# Patient Record
Sex: FEMALE | Race: WHITE
[De-identification: names, ages, dates, MRNs, and addresses within clinical notes are randomized per-mention and may not be internally consistent; named-entity substitution may affect disease eponyms.]

---

## 2018-05-15 ENCOUNTER — HOSPITAL ENCOUNTER (EMERGENCY)
Dept: HOSPITAL 96 - M.ERS | Age: 58
Discharge: HOME | End: 2018-05-15
Payer: MEDICARE

## 2018-05-15 VITALS — WEIGHT: 160.01 LBS | BODY MASS INDEX: 29.45 KG/M2 | HEIGHT: 62 IN

## 2018-05-15 VITALS — DIASTOLIC BLOOD PRESSURE: 73 MMHG | SYSTOLIC BLOOD PRESSURE: 171 MMHG

## 2018-05-15 DIAGNOSIS — F41.9: ICD-10-CM

## 2018-05-15 DIAGNOSIS — Z88.8: ICD-10-CM

## 2018-05-15 DIAGNOSIS — K21.9: ICD-10-CM

## 2018-05-15 DIAGNOSIS — Z88.5: ICD-10-CM

## 2018-05-15 DIAGNOSIS — F32.9: ICD-10-CM
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
## 2018-05-15 DIAGNOSIS — Z90.49: ICD-10-CM

## 2018-05-15 DIAGNOSIS — Z88.6: ICD-10-CM
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
## 2018-05-15 DIAGNOSIS — S22.31XA: Primary | ICD-10-CM

## 2018-07-01 ENCOUNTER — HOSPITAL ENCOUNTER (EMERGENCY)
Dept: HOSPITAL 96 - M.ERS | Age: 58
Discharge: HOME | End: 2018-07-01
Payer: MEDICARE

## 2018-07-01 VITALS — WEIGHT: 160.01 LBS | HEIGHT: 62 IN | BODY MASS INDEX: 29.45 KG/M2

## 2018-07-01 VITALS — SYSTOLIC BLOOD PRESSURE: 156 MMHG | DIASTOLIC BLOOD PRESSURE: 81 MMHG

## 2018-07-01 DIAGNOSIS — Z88.6: ICD-10-CM

## 2018-07-01 DIAGNOSIS — Z88.8: ICD-10-CM

## 2018-07-01 DIAGNOSIS — Z90.49: ICD-10-CM

## 2018-07-01 DIAGNOSIS — K21.9: ICD-10-CM

## 2018-07-01 DIAGNOSIS — R10.31: Primary | ICD-10-CM

## 2018-07-01 DIAGNOSIS — Z88.5: ICD-10-CM

## 2018-07-01 DIAGNOSIS — F32.9: ICD-10-CM

## 2018-07-01 DIAGNOSIS — F41.9: ICD-10-CM

## 2018-07-01 LAB
ABSOLUTE BASOPHILS: 0.1 THOU/UL (ref 0–0.2)
ABSOLUTE EOSINOPHILS: 0.2 THOU/UL (ref 0–0.7)
ABSOLUTE MONOCYTES: 0.6 THOU/UL (ref 0–1.2)
ALBUMIN SERPL-MCNC: 4 G/DL (ref 3.4–5)
ALP SERPL-CCNC: 141 U/L (ref 46–116)
ALT SERPL-CCNC: 18 U/L (ref 30–65)
ANION GAP SERPL CALC-SCNC: 9 MMOL/L (ref 7–16)
AST SERPL-CCNC: 14 U/L (ref 15–37)
BASOPHILS NFR BLD AUTO: 1.1 %
BILIRUB SERPL-MCNC: 0.5 MG/DL
BILIRUB UR-MCNC: NEGATIVE MG/DL
BUN SERPL-MCNC: 13 MG/DL (ref 7–18)
CALCIUM SERPL-MCNC: 9.5 MG/DL (ref 8.5–10.1)
CHLORIDE SERPL-SCNC: 104 MMOL/L (ref 98–107)
CO2 SERPL-SCNC: 29 MMOL/L (ref 21–32)
COLOR UR: YELLOW
CREAT SERPL-MCNC: 0.7 MG/DL (ref 0.6–1.3)
EOSINOPHIL NFR BLD: 2.3 %
GLUCOSE SERPL-MCNC: 98 MG/DL (ref 70–99)
GRANULOCYTES NFR BLD MANUAL: 71.1 %
HCT VFR BLD CALC: 39.7 % (ref 37–47)
HGB BLD-MCNC: 13.3 GM/DL (ref 12–15)
KETONES UR STRIP-MCNC: NEGATIVE MG/DL
LIPASE: 132 U/L (ref 73–393)
LYMPHOCYTES # BLD: 2.2 THOU/UL (ref 0.8–5.3)
LYMPHOCYTES NFR BLD AUTO: 20 %
MCH RBC QN AUTO: 30.9 PG (ref 26–34)
MCHC RBC AUTO-ENTMCNC: 33.6 G/DL (ref 28–37)
MCV RBC: 92 FL (ref 80–100)
MONOCYTES NFR BLD: 5.5 %
MPV: 8.2 FL. (ref 7.2–11.1)
NEUTROPHILS # BLD: 7.8 THOU/UL (ref 1.6–8.1)
NUCLEATED RBCS: 0 /100WBC
PLATELET COUNT*: 328 THOU/UL (ref 150–400)
POTASSIUM SERPL-SCNC: 3.7 MMOL/L (ref 3.5–5.1)
PROT SERPL-MCNC: 7.5 G/DL (ref 6.4–8.2)
PROT UR QL STRIP: NEGATIVE
RBC # BLD AUTO: 4.31 MIL/UL (ref 4.2–5)
RBC # UR STRIP: (no result) /UL
RDW-CV: 13 % (ref 10.5–14.5)
SODIUM SERPL-SCNC: 142 MMOL/L (ref 136–145)
SP GR UR STRIP: 1.01 (ref 1–1.03)
URINE CLARITY: CLEAR
URINE GLUCOSE-RANDOM: NEGATIVE
URINE LEUKOCYTES-REFLEX: NEGATIVE
URINE NITRITE-REFLEX: NEGATIVE
UROBILINOGEN UR STRIP-ACNC: 0.2 E.U./DL (ref 0.2–1)
WBC # BLD AUTO: 11 THOU/UL (ref 4–11)

## 2019-04-15 ENCOUNTER — HOSPITAL ENCOUNTER (OUTPATIENT)
Dept: HOSPITAL 96 - M.CRD | Age: 59
End: 2019-04-15
Attending: INTERNAL MEDICINE
Payer: MEDICARE

## 2019-04-15 DIAGNOSIS — I08.8: Primary | ICD-10-CM

## 2019-04-15 DIAGNOSIS — Z88.5: ICD-10-CM

## 2019-04-15 DIAGNOSIS — Z88.8: ICD-10-CM

## 2019-04-15 DIAGNOSIS — I47.1: ICD-10-CM

## 2019-04-15 NOTE — 2DMMODE
[place] [address]
Phone:  [phone] 2 D/M-MODE ECHOCARDIOGRAM     
_______________________________________________________________________________
 
Name:         MATTEO BENAVIDES             Room:                     REG CLI
M.R.#:    [id number]     Account #:     [id number]  
Admission:    04/15/19    Attend Phys:   Josesito Little,
Discharge:                Date of Birth: [birth year]  
Date of Service: 04/15/19 1342  Report #:      [id number]
        [id number]
_______________________________________________________________________________
THIS REPORT FOR:  //name//                      
 
 
--------------- APPROVED REPORT --------------
 
 
Study performed:  04/15/2019 12:55:01
 
EXAM: Comprehensive 2D, Doppler, and color-flow 
Echocardiogram 
Patient Location: Out-Patient   
 
      BSA:         1.81
HR: 59 bpm BP:          126/72 mmHg 
 
Other Information 
Study Quality: Good
 
Indications
Palpitations
 
2D Dimensions
IVSd:  12.30 (7-11mm) LVOT Diam:  20.40 (18-24mm) 
LVDd:  38.06 mm  
PWd:  9.52 (7-11mm) Ascending Ao:  29.17 (22-36mm)
LVDs:  26.40 (25-40mm) 
Aortic Root:  24.00 mm 
 
Volumes
Left Atrial Volume (Systole) 
    LA ESV Index:  16.10 mL/m2
 
Aortic Valve
AoV Peak Vivek.:  1.12 m/s 
AO Peak Gr.:  5.06 mmHg  LVOT Max PG:  3.84 mmHg
AO Mean Gr.:  2.50 mmHg  LVOT Mean P.77 mmHg
    LVOT Max V:  0.98 m/s
AO V2 VTI:  21.98 cm  LVOT Mean V:  0.60 m/s
SUMANTH (VTI):  3.26 cm2  LVOT V1 VTI:  21.94 cm
 
Mitral Valve
    E/A Ratio:  1.13
    MV Decel. Time:  182.33 ms
MV E Max Vivek.:  0.92 m/s 
MV PHT:  52.88 ms  
MVA (PHT):  4.16 cm2  
 
 
[place] [address]
Phone:  [phone]                     2 D/M-MODE ECHOCARDIOGRAM     
_______________________________________________________________________________
 
Name:         MATTEO BENAVIDES             Room:                     REG CLI
M.R.#:    [id number]     Account #:     [id number]  
Admission:    04/15/19    Attend Phys:   Josesito Little,
Discharge:                Date of Birth: [birth year]  
Date of Service: 04/15/19 1342  Report #:      [id number]
        [id number]
_______________________________________________________________________________
 
TDI
E/Lateral E':  9.20 E/Medial E':  10.22
   Medial E' Vivek.:  0.09 m/s
   Lateral E' Vivek.:  0.10 m/s
 
Pulmonary Valve
PV Peak Vivek.:  0.99 m/s PV Peak Gr.:  3.90 mmHg
 
Tricuspid Valve
    RAP Estimate:  5.00 mmHg
TR Peak Gr.:  18.30 mmHg RVSP:  23.30 mmHg
    PA Pressure:  23.30 mmHg
 
Left Ventricle
The left ventricle is normal size. There is normal LV segmental wall 
motion. There is normal left ventricular wall thickness. Left 
ventricular systolic function is normal. The left ventricular 
ejection fraction is within the normal range. LVEF is 60%. The left 
ventricular diastolic function is normal.
 
Right Ventricle
The right ventricle is normal size. The right ventricular systolic 
function is normal.
 
Atria
The left atrium size is normal. The right atrium size is 
normal.
 
Aortic Valve
The aortic valve is normal in structure. No aortic regurgitation is 
present. There is no aortic valvular stenosis.
 
Mitral Valve
The mitral valve is normal in structure. There is no mitral valve 
regurgitation noted. No evidence of mitral valve stenosis.
 
Tricuspid Valve
The tricuspid valve is normal in structure. Mild tricuspid 
regurgitation.
 
Pulmonic Valve
The pulmonary valve is normal in structure. Mild pulmonic 
regurgitation.
 
[facility]
 
 
[place] [address]
Phone:  [phone]                     2 D/M-MODE ECHOCARDIOGRAM     
_______________________________________________________________________________
 
Name:         MATTEO BENAVIDES             Room:                     REG CLI
M.R.#:    [id number]     Account #:     [id number]  
Admission:    04/15/19    Attend Phys:   Josesito Little,
Discharge:                Date of Birth: [birth year]  
Date of Service: 04/15/19 1342  Report #:      [id number]
        [id number]
_______________________________________________________________________________
The aortic root is normal in size. IVC is normal in size and 
collapses >50% with inspiration.
 
Pericardium
There is no pericardial effusion.
 
<Conclusion>
The left ventricle is normal size.
There is normal left ventricular wall thickness.
Left ventricular systolic function is normal.
The left ventricular ejection fraction is within the normal 
range.
LVEF is 60%.
The right ventricle is normal size.
The left atrium size is normal.
The aortic valve is normal in structure.
The mitral valve is normal in structure.
The tricuspid valve is normal in structure.
IVC is normal in size and collapses >50% with inspiration.
There is no pericardial effusion.
There is normal LV segmental wall motion.
 
 
 
 
 
 
 
 
 
 
 
 
 
 
 
 
 
 
 
 
 
 
 
  <ELECTRONICALLY SIGNED>
                                           By: Davi Fields MD, FACC     
  04/15/19     1342
D: 04/15/19 1342   _____________________________________
T: 04/15/19 134   Davi Fields MD, FACC       /INF

## 2019-08-23 ENCOUNTER — HOSPITAL ENCOUNTER (EMERGENCY)
Dept: HOSPITAL 96 - M.ERS | Age: 59
LOS: 1 days | Discharge: HOME | End: 2019-08-24
Payer: MEDICARE

## 2019-08-23 VITALS — BODY MASS INDEX: 32.2 KG/M2 | HEIGHT: 62 IN | WEIGHT: 175 LBS

## 2019-08-23 DIAGNOSIS — R10.13: ICD-10-CM

## 2019-08-23 DIAGNOSIS — Z85.528: ICD-10-CM

## 2019-08-23 DIAGNOSIS — Z90.710: ICD-10-CM

## 2019-08-23 DIAGNOSIS — K21.9: ICD-10-CM

## 2019-08-23 DIAGNOSIS — Z88.8: ICD-10-CM

## 2019-08-23 DIAGNOSIS — Z88.5: ICD-10-CM

## 2019-08-23 DIAGNOSIS — R19.7: ICD-10-CM

## 2019-08-23 DIAGNOSIS — R11.2: Primary | ICD-10-CM

## 2019-08-23 DIAGNOSIS — Z88.6: ICD-10-CM

## 2019-08-23 DIAGNOSIS — Z90.49: ICD-10-CM

## 2019-08-23 DIAGNOSIS — Z90.5: ICD-10-CM

## 2019-08-23 LAB
ABSOLUTE BASOPHILS: 0.1 THOU/UL (ref 0–0.2)
ABSOLUTE EOSINOPHILS: 0.1 THOU/UL (ref 0–0.7)
ABSOLUTE MONOCYTES: 1.3 THOU/UL (ref 0–1.2)
ALBUMIN SERPL-MCNC: 3.9 G/DL (ref 3.4–5)
ALP SERPL-CCNC: 140 U/L (ref 46–116)
ALT SERPL-CCNC: 22 U/L (ref 30–65)
ANION GAP SERPL CALC-SCNC: 9 MMOL/L (ref 7–16)
AST SERPL-CCNC: 17 U/L (ref 15–37)
BASOPHILS NFR BLD AUTO: 0.6 %
BILIRUB SERPL-MCNC: 0.5 MG/DL
BILIRUB UR-MCNC: NEGATIVE MG/DL
BUN SERPL-MCNC: 14 MG/DL (ref 7–18)
CALCIUM SERPL-MCNC: 8.4 MG/DL (ref 8.5–10.1)
CHLORIDE SERPL-SCNC: 104 MMOL/L (ref 98–107)
CO2 SERPL-SCNC: 28 MMOL/L (ref 21–32)
COLOR UR: YELLOW
CREAT SERPL-MCNC: 0.8 MG/DL (ref 0.6–1.3)
EOSINOPHIL NFR BLD: 0.7 %
GLUCOSE SERPL-MCNC: 111 MG/DL (ref 70–99)
GRANULOCYTES NFR BLD MANUAL: 81.8 %
HCT VFR BLD CALC: 41 % (ref 37–47)
HGB BLD-MCNC: 13.6 GM/DL (ref 12–15)
INR PPP: 0.9
KETONES UR STRIP-MCNC: NEGATIVE MG/DL
LIPASE: 82 U/L (ref 73–393)
LYMPHOCYTES # BLD: 2 THOU/UL (ref 0.8–5.3)
LYMPHOCYTES NFR BLD AUTO: 10.2 %
MCH RBC QN AUTO: 31 PG (ref 26–34)
MCHC RBC AUTO-ENTMCNC: 33.2 G/DL (ref 28–37)
MCV RBC: 93.4 FL (ref 80–100)
MONOCYTES NFR BLD: 6.7 %
MPV: 8.3 FL. (ref 7.2–11.1)
NEUTROPHILS # BLD: 16 THOU/UL (ref 1.6–8.1)
NUCLEATED RBCS: 0 /100WBC
PLATELET COUNT*: 316 THOU/UL (ref 150–400)
POTASSIUM SERPL-SCNC: 3.7 MMOL/L (ref 3.5–5.1)
PROT SERPL-MCNC: 7.3 G/DL (ref 6.4–8.2)
PROT UR QL STRIP: NEGATIVE
PROTHROMBIN TIME: 9.7 SECONDS (ref 9.2–11.5)
RBC # BLD AUTO: 4.39 MIL/UL (ref 4.2–5)
RBC # UR STRIP: NEGATIVE /UL
RDW-CV: 13.2 % (ref 10.5–14.5)
SODIUM SERPL-SCNC: 141 MMOL/L (ref 136–145)
SP GR UR STRIP: 1.01 (ref 1–1.03)
TROPONIN-I LEVEL: <0.06 NG/ML (ref ?–0.06)
URINE CLARITY: CLEAR
URINE GLUCOSE-RANDOM: NEGATIVE
URINE LEUKOCYTES-REFLEX: NEGATIVE
URINE NITRITE-REFLEX: NEGATIVE
UROBILINOGEN UR STRIP-ACNC: 0.2 E.U./DL (ref 0.2–1)
WBC # BLD AUTO: 19.6 THOU/UL (ref 4–11)

## 2019-08-24 VITALS — SYSTOLIC BLOOD PRESSURE: 133 MMHG | DIASTOLIC BLOOD PRESSURE: 79 MMHG

## 2020-01-11 ENCOUNTER — HOSPITAL ENCOUNTER (EMERGENCY)
Dept: HOSPITAL 96 - M.ERS | Age: 60
LOS: 1 days | Discharge: HOME | End: 2020-01-12
Payer: MEDICARE

## 2020-01-11 VITALS — HEIGHT: 62 IN | BODY MASS INDEX: 32.94 KG/M2 | WEIGHT: 179 LBS

## 2020-01-11 DIAGNOSIS — Z88.6: ICD-10-CM

## 2020-01-11 DIAGNOSIS — Z90.49: ICD-10-CM

## 2020-01-11 DIAGNOSIS — Z88.8: ICD-10-CM

## 2020-01-11 DIAGNOSIS — Z90.5: ICD-10-CM

## 2020-01-11 DIAGNOSIS — Z90.710: ICD-10-CM

## 2020-01-11 DIAGNOSIS — K21.9: ICD-10-CM

## 2020-01-11 DIAGNOSIS — Z88.5: ICD-10-CM

## 2020-01-11 DIAGNOSIS — R10.13: Primary | ICD-10-CM

## 2020-01-11 DIAGNOSIS — Z85.528: ICD-10-CM

## 2020-01-11 LAB
ABSOLUTE BASOPHILS: 0.2 THOU/UL (ref 0–0.2)
ABSOLUTE EOSINOPHILS: 0.3 THOU/UL (ref 0–0.7)
ABSOLUTE MONOCYTES: 1.2 THOU/UL (ref 0–1.2)
ALBUMIN SERPL-MCNC: 4 G/DL (ref 3.4–5)
ALP SERPL-CCNC: 124 U/L (ref 46–116)
ALT SERPL-CCNC: 25 U/L (ref 30–65)
ANION GAP SERPL CALC-SCNC: 10 MMOL/L (ref 7–16)
AST SERPL-CCNC: 15 U/L (ref 15–37)
BASOPHILS NFR BLD AUTO: 1.5 %
BILIRUB SERPL-MCNC: 0.2 MG/DL
BILIRUB UR-MCNC: NEGATIVE MG/DL
BUN SERPL-MCNC: 18 MG/DL (ref 7–18)
CALCIUM SERPL-MCNC: 9 MG/DL (ref 8.5–10.1)
CHLORIDE SERPL-SCNC: 104 MMOL/L (ref 98–107)
CO2 SERPL-SCNC: 28 MMOL/L (ref 21–32)
COLOR UR: YELLOW
CREAT SERPL-MCNC: 1.1 MG/DL (ref 0.6–1.3)
EOSINOPHIL NFR BLD: 2 %
GLUCOSE SERPL-MCNC: 110 MG/DL (ref 70–99)
GRANULOCYTES NFR BLD MANUAL: 64.4 %
HCT VFR BLD CALC: 37.1 % (ref 37–47)
HGB BLD-MCNC: 12.5 GM/DL (ref 12–15)
KETONES UR STRIP-MCNC: NEGATIVE MG/DL
LIPASE: 115 U/L (ref 73–393)
LYMPHOCYTES # BLD: 3.6 THOU/UL (ref 0.8–5.3)
LYMPHOCYTES NFR BLD AUTO: 24.1 %
MCH RBC QN AUTO: 30.1 PG (ref 26–34)
MCHC RBC AUTO-ENTMCNC: 33.7 G/DL (ref 28–37)
MCV RBC: 89.2 FL (ref 80–100)
MONOCYTES NFR BLD: 8 %
MPV: 8.2 FL. (ref 7.2–11.1)
NEUTROPHILS # BLD: 9.6 THOU/UL (ref 1.6–8.1)
NUCLEATED RBCS: 0 /100WBC
PLATELET COUNT*: 319 THOU/UL (ref 150–400)
POTASSIUM SERPL-SCNC: 4.5 MMOL/L (ref 3.5–5.1)
PROT SERPL-MCNC: 6.8 G/DL (ref 6.4–8.2)
PROT UR QL STRIP: NEGATIVE
RBC # BLD AUTO: 4.16 MIL/UL (ref 4.2–5)
RBC # UR STRIP: NEGATIVE /UL
RDW-CV: 13.5 % (ref 10.5–14.5)
SODIUM SERPL-SCNC: 142 MMOL/L (ref 136–145)
SP GR UR STRIP: 1.01 (ref 1–1.03)
URINE CLARITY: CLEAR
URINE GLUCOSE-RANDOM: NEGATIVE
URINE LEUKOCYTES-REFLEX: NEGATIVE
URINE NITRITE-REFLEX: NEGATIVE
UROBILINOGEN UR STRIP-ACNC: 0.2 E.U./DL (ref 0.2–1)
WBC # BLD AUTO: 14.9 THOU/UL (ref 4–11)

## 2020-01-12 VITALS — DIASTOLIC BLOOD PRESSURE: 74 MMHG | SYSTOLIC BLOOD PRESSURE: 129 MMHG

## 2020-01-12 NOTE — EKG
[place] [address]
Phone:  [phone]                     ELECTROCARDIOGRAM REPORT      
_______________________________________________________________________________
 
Name:       MATTEO BENAVIDES              Room:                      [facility]#:  [id number]      Account #:      [id number]  
Admission:  20     Attend Phys:                         
Discharge:  20     Date of Birth:  [birth year]  
         Report #: [id number]
    [id number]
_______________________________________________________________________________
THIS REPORT FOR:  //name//                      
 
                         [facility] ED
                                       
Test Date:    2020               Test Time:    21:48:02
Pat Name:     MATTEO BENAVIDES            Department:   
Patient ID:   [id number]            Room:          
Gender:       F                        Technician:   FL
:          1960               Requested By: Vivian Toth
Order Number: [id number] MD:   Cordell Diehl
                                 Measurements
Intervals                              Axis          
Rate:         91                       P:            75
OR:           186                      QRS:          42
QRSD:         78                       T:            52
QT:           345                                    
QTc:          425                                    
                           Interpretive Statements
Sinus rhythm
Compared to ECG 2017 21:21:03
No significant changes
 
Electronically Signed On 2020 10:37:25 CST by Cordell Diehl
[email or web address]
 
 
 
 
 
 
 
 
 
 
 
 
 
 
 
 
 
 
 
  <ELECTRONICALLY SIGNED>
                                           By: ADIEL Diehl MD, [facility]    
  20     1037
D: 20 2148   _____________________________________
T: 20   ADIEL Diehl MD, FACC      /EPI

## 2020-01-21 ENCOUNTER — HOSPITAL ENCOUNTER (INPATIENT)
Dept: HOSPITAL 96 - M.ERS | Age: 60
LOS: 3 days | Discharge: HOME | DRG: 391 | End: 2020-01-24
Attending: INTERNAL MEDICINE | Admitting: INTERNAL MEDICINE
Payer: MEDICARE

## 2020-01-21 VITALS — DIASTOLIC BLOOD PRESSURE: 78 MMHG | SYSTOLIC BLOOD PRESSURE: 139 MMHG

## 2020-01-21 VITALS — SYSTOLIC BLOOD PRESSURE: 164 MMHG | DIASTOLIC BLOOD PRESSURE: 75 MMHG

## 2020-01-21 VITALS — DIASTOLIC BLOOD PRESSURE: 78 MMHG | SYSTOLIC BLOOD PRESSURE: 149 MMHG

## 2020-01-21 VITALS — WEIGHT: 175 LBS | BODY MASS INDEX: 31.8 KG/M2 | HEIGHT: 62.01 IN

## 2020-01-21 DIAGNOSIS — K86.1: ICD-10-CM

## 2020-01-21 DIAGNOSIS — K44.9: ICD-10-CM

## 2020-01-21 DIAGNOSIS — F17.210: ICD-10-CM

## 2020-01-21 DIAGNOSIS — K21.9: ICD-10-CM

## 2020-01-21 DIAGNOSIS — F32.9: ICD-10-CM

## 2020-01-21 DIAGNOSIS — Z90.49: ICD-10-CM

## 2020-01-21 DIAGNOSIS — Z90.710: ICD-10-CM

## 2020-01-21 DIAGNOSIS — Z85.528: ICD-10-CM

## 2020-01-21 DIAGNOSIS — Z90.5: ICD-10-CM

## 2020-01-21 DIAGNOSIS — K85.90: ICD-10-CM

## 2020-01-21 DIAGNOSIS — K29.90: Primary | ICD-10-CM

## 2020-01-21 DIAGNOSIS — Z88.8: ICD-10-CM

## 2020-01-21 LAB
ABSOLUTE BASOPHILS: 0.1 THOU/UL (ref 0–0.2)
ABSOLUTE EOSINOPHILS: 0.3 THOU/UL (ref 0–0.7)
ABSOLUTE MONOCYTES: 0.9 THOU/UL (ref 0–1.2)
ALBUMIN SERPL-MCNC: 3.9 G/DL (ref 3.4–5)
ALP SERPL-CCNC: 119 U/L (ref 46–116)
ALT SERPL-CCNC: 30 U/L (ref 30–65)
ANION GAP SERPL CALC-SCNC: 10 MMOL/L (ref 7–16)
AST SERPL-CCNC: 21 U/L (ref 15–37)
BASOPHILS NFR BLD AUTO: 1.2 %
BILIRUB SERPL-MCNC: 0.5 MG/DL
BILIRUB UR-MCNC: NEGATIVE MG/DL
BUN SERPL-MCNC: 12 MG/DL (ref 7–18)
CALCIUM SERPL-MCNC: 9 MG/DL (ref 8.5–10.1)
CHLORIDE SERPL-SCNC: 105 MMOL/L (ref 98–107)
CO2 SERPL-SCNC: 28 MMOL/L (ref 21–32)
COLOR UR: YELLOW
CREAT SERPL-MCNC: 0.9 MG/DL (ref 0.6–1.3)
EOSINOPHIL NFR BLD: 2.8 %
GLUCOSE SERPL-MCNC: 110 MG/DL (ref 70–99)
GRANULOCYTES NFR BLD MANUAL: 59.7 %
HCT VFR BLD CALC: 40.6 % (ref 37–47)
HGB BLD-MCNC: 13.7 GM/DL (ref 12–15)
KETONES UR STRIP-MCNC: (no result) MG/DL
LIPASE: 110 U/L (ref 73–393)
LYMPHOCYTES # BLD: 2.8 THOU/UL (ref 0.8–5.3)
LYMPHOCYTES NFR BLD AUTO: 27.8 %
MCH RBC QN AUTO: 30.5 PG (ref 26–34)
MCHC RBC AUTO-ENTMCNC: 33.8 G/DL (ref 28–37)
MCV RBC: 90.2 FL (ref 80–100)
MONOCYTES NFR BLD: 8.5 %
MPV: 8.4 FL. (ref 7.2–11.1)
NEUTROPHILS # BLD: 6.1 THOU/UL (ref 1.6–8.1)
NUCLEATED RBCS: 0 /100WBC
PLATELET COUNT*: 336 THOU/UL (ref 150–400)
POTASSIUM SERPL-SCNC: 3.9 MMOL/L (ref 3.5–5.1)
PROT SERPL-MCNC: 7.3 G/DL (ref 6.4–8.2)
PROT UR QL STRIP: NEGATIVE
RBC # BLD AUTO: 4.51 MIL/UL (ref 4.2–5)
RBC # UR STRIP: NEGATIVE /UL
RDW-CV: 13.5 % (ref 10.5–14.5)
SODIUM SERPL-SCNC: 143 MMOL/L (ref 136–145)
SP GR UR STRIP: 1.02 (ref 1–1.03)
URINE CLARITY: CLEAR
URINE GLUCOSE-RANDOM: NEGATIVE
URINE LEUKOCYTES-REFLEX: NEGATIVE
URINE NITRITE-REFLEX: NEGATIVE
UROBILINOGEN UR STRIP-ACNC: 1 E.U./DL (ref 0.2–1)
WBC # BLD AUTO: 10.2 THOU/UL (ref 4–11)

## 2020-01-21 NOTE — PROC
[facility] 
[address] [place]  [address]                    PROCEDURE REPORT              
_______________________________________________________________________________
 
Name:       MATTEO BENAVIDES              Room:           [address] [place]  
..#:  [id number]      Account #:      [id number]  
Admission:  01/21/20     Attend Phys:    JASWANT Marte
Discharge:               Date of Birth:  [birth year]  
         Report #: [id number]
                                                                                
_______________________________________________________________________________
THIS REPORT FOR:  //name//                      
 
For GI report, please see the Provation report in Perceptive 7 content.
 
 
 
 
 
 
 
 
 
 
 
 
 
 
 
 
 
 
 
 
 
 
 
 
 
 
 
 
 
 
 
 
 
 
 
 
 
 
 
 
 
                       
                                        By:                                
                 
D: 01/22/20     _______________________________________
T: 01/24/20 0639Medical Records Staff JADYN       /AL

## 2020-01-21 NOTE — CON
[facility] 
[address] [place]  [address]                    CONSULTATION                  
_______________________________________________________________________________
 
Name:       MATTEO BENAVIDES              Room:           [address] [place]  
M.R.#:  [id number]      Account #:      [id number]  
Admission:  01/21/20     Attend Phys:    JASWANT Marte
Discharge:               Date of Birth:  [birth year]  
         Report #: [id number]
                                                                     [id number]  
_______________________________________________________________________________
THIS REPORT FOR:  //name//                      
 
CC: ROLO Cosme
 
DICTATED BY: Azra Rolon [facility]
 
Please note at the time of this dictation, the patient was seen and physically
examined by myself.
 
HISTORY OF PRESENT ILLNESS:  This is a 59-year-old female who presented to the
Emergency Room that she started having abdominal pain that wraps around into her
back and nausea for 2 hours prior to her coming in.  She does have a history of
chronic pancreatitis she has been told.  She initially was diagnosed with
pancreatitis many years ago and was found to be gallstone related.  She is not
able to tell me when the last time she had an exacerbation of this at that time
and she does not take any pancreatic enzymes either.  The patient states she
does have issues with acid reflux in which she takes Nexium and ranitidine for. 
She will have some breakthrough every couple of months and she will do some
baking soda water to help with that.  The patient states that sometimes when she
eats red sauce, it may exacerbate or worsen some of her symptoms.  She states
that she has seen [facility] in the past and done upper and lower scopes.  She
cannot recall how long ago or what the findings were at that time and we will
attempt to get those records for further recommendations.  The patient denies
any vomiting, no diarrhea, no constipation, fever or chills upon admission.
 
ALLERGIES:  INCLUDE ATIVAN, DEMEROL, VICODIN, CODEINE, BENADRYL, MORPHINE,
TORADOL AND PHENERGAN.
 
PAST MEDICAL HISTORY:  GERD.  She has had renal cancer.  Ocular histoplasmosis,
macular degeneration and she is blind.
 
PAST SURGICAL HISTORY:  Partial right nephrectomy, cholecystectomy and
hysterectomy.
 
FAMILY HISTORY:  Negative for any GI or female cancers.
 
SOCIAL HISTORY:  She does smoke 3/4 of a pack a day.  Denies any alcohol or
illegal drug use.
 
REVIEW OF SYSTEMS:  Twelve-point review of systems is essentially negative
except what is mentioned in the HPI.
 
PHYSICAL EXAMINATION:
VITAL SIGNS:  Temperature 36.6, pulse 69, respirations 17, blood pressure
 
 
 
[place] [address]                    CONSULTATION                  
_______________________________________________________________________________
 
Name:       MATTEO BENAVIDES              Room:           [address] [place]  
[facility]#:  [id number]      Account #:      [id number]  
Admission:  01/21/20     Attend Phys:    JASWANT Marte
Discharge:               Date of Birth:  [birth year]  
         Report #: [id number]
                                                                     [id number]  
_______________________________________________________________________________
133/70.
HEART:  Regular rate and rhythm.
LUNGS:  Clear.
ABDOMEN:  Soft, positive bowel sounds in all 4 quadrants with some epigastric
tenderness noted to palpation.
 
LABORATORY DATA:  Hemoglobin is 13.7, white count is 10.2, platelets 336.  Her
troponin is negative.  Her lipase was only 110, total bilirubin 0.5, alkaline
phosphatase 119, ALT 30, AST is 21.  
 
CT showed post-cholecystectomy, mild intrahepatic biliary dilatation noted,
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
antrum and duodenum.
 
IMPRESSION:
1.  Abdominal pain, epigastric.
2.  Nausea.
3.  Gastroesophageal reflux disease.
4.  Abnormal CT.
5.  Elevated alkaline phosphatase.
6.  History of pancreatitis related to gallstones in the past.
7.  History of renal cell.
 
PLAN:
1.  EGD today with Dr. Mora.
2.  We will obtain her records from [facility].
3.  Further recommendations to be made after the above has been noted.
 
Thank you for allowing us to participate in this patient's care.  Please do not
hesitate to call with any questions in regard to this consult.
 
 
 
 
 
 
 
 
 
 
 
 
 
 
                       
                                        By:                                
                 
D: 01/22/20 1059_______________________________________
T: 01/23/20 0011Hosea Mora MD            /nt

## 2020-01-22 VITALS — SYSTOLIC BLOOD PRESSURE: 133 MMHG | DIASTOLIC BLOOD PRESSURE: 70 MMHG

## 2020-01-22 VITALS — SYSTOLIC BLOOD PRESSURE: 136 MMHG | DIASTOLIC BLOOD PRESSURE: 68 MMHG

## 2020-01-22 PROCEDURE — 0DB68ZX EXCISION OF STOMACH, VIA NATURAL OR ARTIFICIAL OPENING ENDOSCOPIC, DIAGNOSTIC: ICD-10-PCS | Performed by: INTERNAL MEDICINE

## 2020-01-22 NOTE — EKG
[place] [address]
Phone:  [phone]                     ELECTROCARDIOGRAM REPORT      
_______________________________________________________________________________
 
Name:       MATTEO BENAVIDES              Room:           [address]    [place]  
.R.#:  [id number]      Account #:      [id number]  
Admission:  20     Attend Phys:    JASWANT Marte
Discharge:               Date of Birth:  [birth year]  
         Report #: [id number]
    [id number]
_______________________________________________________________________________
THIS REPORT FOR:  //name//                      
 
                         [facility] ED
                                       
Test Date:    2020               Test Time:    16:34:47
Pat Name:     MATTEO BENAVIDES            Department:   
Patient ID:   [id number]            Room:         [facility]
Gender:       F                        Technician:   ALISHA
:          1960               Requested By: Cira Marshall
Order Number: [id number] MD:   Sixto Martinez
                                 Measurements
Intervals                              Axis          
Rate:         69                       P:            50
MA:           188                      QRS:          26
QRSD:         79                       T:            47
QT:           409                                    
QTc:          438                                    
                           Interpretive Statements
Sinus rhythm
artifact noted
Low voltage, precordial leads
Compared to ECG 2020 21:48:02
Low QRS voltage now present
 
Electronically Signed On 2020 10:19:47 CST by Sixto Martinez
[email or web address]
 
 
 
 
 
 
 
 
 
 
 
 
 
 
 
 
 
  <ELECTRONICALLY SIGNED>
                                           By: Sixto Martinez MD, [facility]      
  20     1019
D: 20 1634   _____________________________________
T: 20 1634   Sixto Martinez MD, [facility]        /EPI

## 2020-01-22 NOTE — NUR
ASSUMED PT CARE AT 0800, AOX4, UP WITH ASSIST, O2 SAT AT 90'S RA. PT COMPLAINS
OF ABDOMINAL PAIN. PAIN MEDS GIVEN PER MAR. IVF INFUSSING AS ORDERED. PT HAS
GI CONSULT. PT FOR EDG. VSS, AM ASSESSMENT AS CHARTED, CALL LIGHT WITHIN
REACH, HOURLY ROUNDING, BED ALARM, WILL CONTINUE TO MONITOR.

## 2020-01-22 NOTE — NUR
PATIENT ARRIVED ON FLOOR FROM THE ER AT ABOUT 2015. PATIENT ADMISSION HISTORY
AND ASSESSMENT WAS COMPLETED AS CHARTED. IV FLUIDS WERE STARTED  ML/HR.
PATIENT IS BLIND AND WAS INSTRUCTED TO CALL FOR HELP WHEN GETTING UP. BED
ALARM REMAINS ON FOR PATIENT SAFETY. PATIENT WAS GIVEN PAIN AND NAUSEA MEDS
TWICE SINCE ARRIVAL TO THE FLOOR. WILL CONTINUE TO MONITOR.

## 2020-01-23 VITALS — DIASTOLIC BLOOD PRESSURE: 54 MMHG | SYSTOLIC BLOOD PRESSURE: 117 MMHG

## 2020-01-23 VITALS — SYSTOLIC BLOOD PRESSURE: 150 MMHG | DIASTOLIC BLOOD PRESSURE: 76 MMHG

## 2020-01-23 VITALS — DIASTOLIC BLOOD PRESSURE: 74 MMHG | SYSTOLIC BLOOD PRESSURE: 131 MMHG

## 2020-01-23 NOTE — NUR
INITIAL ASSESSMENT COMPLETED AS CHARTED. VSS. REFER TO COMPUTER CHARTING FOR
FURTHER DETAIL. HOURLY ROUNDING IN PLACE FOR PT SAFETY. CLWR.

## 2020-01-23 NOTE — NUR
PT ALERT AND ORIENTED X4.  UP WITH ASSIST OF ONE. PT POLITE AND COOPERATIVE
WITH CARES.  C/O ABDOMINAL PAIN, PRN PAIN MEDICAITON X2 THIS SHIFT.  PT HAS
MACULAR DEGENERATION.  CALL LIGHT IN REACH.  BED ALARM ON FOR SAFETY.  HOURLY
ROUNDING IN PROGRESS, WILL CONTINUE TO MONITOR.

## 2020-01-23 NOTE — NUR
THIS NURSE ASSUMED CARE OF THIS PT AT 1600. PT RESTS IN BED WITH CALL LIGHT IN
REACH. PT ABLE TO USE CALL LIGHT TO MAKE NEEDS KNOWN. PT CHANGED TO CLEAR
LIQUIDS FOR DINNER. PT REMAINS CONTINENT OF B/B. PT DENIES NAUSEA BUT REQUESTS
PRN PAIN PILL. WILL CONTINUE TO MONITOR.

## 2020-01-24 VITALS — DIASTOLIC BLOOD PRESSURE: 80 MMHG | SYSTOLIC BLOOD PRESSURE: 149 MMHG

## 2020-01-24 VITALS — SYSTOLIC BLOOD PRESSURE: 136 MMHG | DIASTOLIC BLOOD PRESSURE: 65 MMHG

## 2020-01-24 NOTE — NUR
PT A&OX4 VSS. IV TO LAC DC'D PRIOR TO PT LEAVING UNIT. PT UP AD PAM, ASSIST AS
NEEDED D/T VISUAL IMPAIRMENT. PT REMAINS FREE OF FALLS. PO PAIN MEDICATION
ADMINISTERED AS DIRECTED. PT RESTS IN ROOM WITH CALL LIGHT IN REACH. PT USES
CALL LIGHT TO MAKE NEEDS KNOWN. PT DRESSED INDEPENDENTLY FOR DC TO HOME.PT
TOLERATES LIQUIDS AS ORDERED ANS ADVISED TO ADVANCE DIET TO REGULAR ADS
TOLERATED. PT STATES UNDERSTANDING OF DC INSTRUCTIONS AND F/U RECOMMENDATIONS.
PT AMB FROM UNIT WITH CAREGIVER, GAIT STEADY.

## 2020-01-24 NOTE — NUR
PATIENT SLEPT WELL DURING THIS SHIFT.  PT UP AD PAM TO BATHROOM.  PT C/O PAIN
AND RECEIVED FENTANYL 50MCG IV X2, ZOFRAN X1 AND TYLENOL FOR HEADACHE X1.  PT
SAID SHE IS WANTING TO GO HOME TODAY.  PT DENIES NEEDS AT THIS TIME.
FREQUENTLY USED ITEMS AND CALL LIGHT WITHIN REACH.  WILL CONTINUE TO MONITOR.

## 2020-01-28 NOTE — PATH
[address]  [address]                    PATHOLOGY RPT PROCEDURE       
_______________________________________________________________________________
 
Name:       DELMIS BENAVIDES              Room:           [address] [place]  
.R.#:  [id number]      Account #:      [id number]  
Admission:  01/21/20     Date of Birth:  [birth year]  
Discharge:  01/24/20                   Report #:    [id number]
                                                         Path Case #: [id number]
_______________________________________________________________________________
 
[facility] Accession Number: [id number]
.                                                                01
Material submitted:                                        .
stomach - GASTRIC BIOPSY
.                                                                01
Clinical history:                                          .
For H. pylori
.                                                                02
**********************************************************************
Diagnosis:
Gastric biopsy:
- Mild chronic gastritis suggesting reactive gastropathy (chemical
gastritis), negative for Helicobacter pylori organisms and dysplasia.
(RONEN/db; 1/24/2020)
LBQ  01/24/2020  1509 Local
**********************************************************************
.                                                                02
Comment:
Special stain: H. pylori immuno
.                                                                02
Electronically signed:                                     .
Reji Tillman MD, Pathologist
NPI- [id number]
.                                                                01
Gross description:                                         .
The specimen is received in formalin, labeled "Delmis Benavides, gastric
biopsy for H. pylori".  Received are two segments of pale tan soft tissue
ranging in size from 0.4 to 0.5 cm in maximum dimensions.  The specimen is
submitted entirely in cassette A1.
(CAA; 1/23/2020)
QAC/QAC  01/23/2020  0937 Local
.                                                                02
Pathologist provided ICD-10:
K29.50
.                                                                02
CPT                                                        .
857207, T60920
Specimen Comment: A courtesy copy of this report has been sent to [phone],
[phone]
Specimen Comment: [id number], [phone]
Specimen Comment: Report sent to ,DR MELÉNDEZ / DR ARANA
***Performed at:  01
   [address], [place]  [id number]
   MD Edmond Thompson MD Phone:  [phone]
***Performed at:  02
   [address]  [id number]
 
[place] [address]                    PATHOLOGY RPT PROCEDURE       
_______________________________________________________________________________
 
Name:       KENNEDYDELMISZENAIDA REED              Room:           [address] [place]  
M.R.#:  [id number]      Account #:      [id number]  
Admission:  01/21/20     Date of Birth:  [birth year]  
Discharge:  01/24/20                   Report #:    [id number]
                                                         Path Case #: [id number]
_______________________________________________________________________________
   MD Reji Tillman MD Phone:  [phone]

## 2020-08-25 ENCOUNTER — HOSPITAL ENCOUNTER (OUTPATIENT)
Dept: HOSPITAL 96 - M.RAD | Age: 60
End: 2020-08-25
Attending: FAMILY MEDICINE
Payer: COMMERCIAL

## 2020-08-25 DIAGNOSIS — Z12.31: Primary | ICD-10-CM
